# Patient Record
Sex: FEMALE | Race: WHITE | URBAN - METROPOLITAN AREA
[De-identification: names, ages, dates, MRNs, and addresses within clinical notes are randomized per-mention and may not be internally consistent; named-entity substitution may affect disease eponyms.]

---

## 2017-07-01 ENCOUNTER — TRANSFERRED RECORDS (OUTPATIENT)
Dept: HEALTH INFORMATION MANAGEMENT | Facility: CLINIC | Age: 22
End: 2017-07-01

## 2017-07-01 LAB — PAP SMEAR - HIM PATIENT REPORTED: NEGATIVE

## 2018-02-13 ENCOUNTER — OFFICE VISIT (OUTPATIENT)
Dept: DERMATOLOGY | Facility: CLINIC | Age: 23
End: 2018-02-13
Payer: COMMERCIAL

## 2018-02-13 VITALS — OXYGEN SATURATION: 99 % | SYSTOLIC BLOOD PRESSURE: 109 MMHG | HEART RATE: 80 BPM | DIASTOLIC BLOOD PRESSURE: 68 MMHG

## 2018-02-13 DIAGNOSIS — L70.0 ACNE VULGARIS: Primary | ICD-10-CM

## 2018-02-13 PROCEDURE — 99203 OFFICE O/P NEW LOW 30 MIN: CPT | Performed by: PHYSICIAN ASSISTANT

## 2018-02-13 RX ORDER — TRETINOIN 0.25 MG/G
CREAM TOPICAL
Qty: 45 G | Refills: 1 | Status: SHIPPED | OUTPATIENT
Start: 2018-02-13 | End: 2018-11-19

## 2018-02-13 RX ORDER — SPIRONOLACTONE 100 MG/1
TABLET, FILM COATED ORAL
Qty: 90 TABLET | Refills: 1 | Status: SHIPPED | OUTPATIENT
Start: 2018-02-13 | End: 2019-01-16

## 2018-02-13 RX ORDER — DOXYCYCLINE 100 MG/1
CAPSULE ORAL
Qty: 60 CAPSULE | Refills: 1 | Status: SHIPPED | OUTPATIENT
Start: 2018-02-13

## 2018-02-13 NOTE — NURSING NOTE
Chief Complaint   Patient presents with     Acne       Initial /68  Pulse 80  SpO2 99% There is no height or weight on file to calculate BMI.  Medication Reconciliation: complete    Madeline Blackwell MA

## 2018-02-13 NOTE — PATIENT INSTRUCTIONS
Start spironolactone 100 mg once per day     Start doxycycline 100 mg twice per day for 3 months     Apply pea sized amount of tretinoin cream to entire face at night     Follow up in 2 months for recheck on acne

## 2018-02-13 NOTE — MR AVS SNAPSHOT
"              After Visit Summary   2/13/2018    Annabella Garcia    MRN: [id number]           Patient Information     Date Of Birth          1995        Visit Information        Provider Department      2/13/2018 5:30 PM Hallie Us PA-C [facility]        Today's Diagnoses     Acne vulgaris    -  1      Care Instructions    Start spironolactone 100 mg once per day     Start doxycycline 100 mg twice per day for 3 months     Apply pea sized amount of tretinoin cream to entire face at night     Follow up in 2 months for recheck on acne          Follow-ups after your visit        Follow-up notes from your care team     Return in about 2 months (around 4/13/2018).      Who to contact     If you have questions or need follow up information about today's clinic visit or your schedule please contact [facility] directly at [phone].  Normal or non-critical lab and imaging results will be communicated to you by MyChart, letter or phone within 4 business days after the clinic has received the results. If you do not hear from us within 7 days, please contact the clinic through MyChart or phone. If you have a critical or abnormal lab result, we will notify you by phone as soon as possible.  Submit refill requests through [employer] or call your pharmacy and they will forward the refill request to us. Please allow 3 business days for your refill to be completed.          Additional Information About Your Visit        MyChart Information     [employer] lets you send messages to your doctor, view your test results, renew your prescriptions, schedule appointments and more. To sign up, go to [email or web address] . Click on \"Log in\" on the left side of the screen, which will take you to the Welcome page. Then click on \"Sign up Now\" on the right side of the page.     You will be asked to enter the access code listed below, as well as some personal information. Please " follow the directions to create your username and password.     Your access code is: [email or web address]  Expires: 2018  5:46 PM     Your access code will  in 90 days. If you need help or a new code, please call your [facility] clinic or [phone].        Care EveryWhere ID     This is your Care EveryWhere ID. This could be used by other organizations to access your [facility] medical records  [id number]        Your Vitals Were     Pulse Pulse Oximetry                80 99%           Blood Pressure from Last 3 Encounters:   18 109/68   14 112/68    Weight from Last 3 Encounters:   14 60.7 kg (133 lb 12.8 oz) (63 %)*     * Growth percentiles are based on [facility] 2-20 Years data.              Today, you had the following     No orders found for display       Primary Care Provider Fax #    Physician No Ref-Primary [phone]       No address on file        Equal Access to Services     EMPERATRIZ MITCHELL : Hadii jax davis hadasho Soomaali, waaxda luqadaha, qaybta kaalmada adeegyada, sara herring . So [facility] [phone].    ATENCIÓN: Si habla español, tiene a lema disposición servicios gratuitos de asistencia lingüística. Llame al [phone].    We comply with applicable federal civil rights laws and [place] laws. We do not discriminate on the basis of race, color, national origin, age, disability, sex, sexual orientation, or gender identity.            Thank you!     Thank you for choosing [facility]  for your care. Our goal is always to provide you with excellent care. Hearing back from our patients is one way we can continue to improve our services. Please take a few minutes to complete the written survey that you may receive in the mail after your visit with us. Thank you!             Your Updated Medication List - Protect others around you: Learn how to safely use, store and throw away your medicines at www.disposemymeds.org.      Notice  As of  2/13/2018  5:46 PM    You have not been prescribed any medications.

## 2018-02-13 NOTE — LETTER
2/13/2018         RE: Annabella Garcia  [address]  [place] [address]        Dear Colleague,    Thank you for referring your patient, Annabella Garcia, to the [facility]. Please see a copy of my visit note below.    HPI:   Annabella Garcia is a 22 year old female who presents for evaluation of acne.  chief complaint  Condition has been present for: years. Acne was previously well controlled with OCP, however, it flared again when she switched to IUD a couple of months ago  Pt complains of pain: Yes     Previous treatments include: OCP  Menstrual cycles are regular: Yes   Condition flares around period: Yes  Areas Involved: face  IPLEDGE #:   No current outpatient prescriptions on file.     No Known Allergies  Denies any other skin complaints, in general feels well: Yes  Review of symptoms otherwise negative:Yes    Social history: Works in [employer].     This document serves as a record of the services and decisions personally performed and made by Hallie Us, MS, PA-C. It was created on her behalf by Mirela Davidson, a trained medical scribe. The creation of this document is based on the provider's statements to the medical scribe.  Mirela Davidson 5:40 PM February 13, 2018    PHYSICAL EXAM:   A&Ox3: Yes   Well developed/well nourished female Yes   Mood appropriate Yes        Type 2 skin. Mood appropriate  Alert and Oriented X 3. Well developed, well nourished in no distress.  General appearance: Normal  Head including face: Normal  Eyes: conjunctiva and lids: Normal  Mouth: Lips, teeth, gums: Normal  Neck: Normal  Back: clear  Cardiovascular: Exam of peripheral vascular system by observation for swelling, varicosities, edema: Normal  Extremities: digits/nails (clubbing): Normal  Right upper extremity: Normal  Left upper extremity: Normal  Right lower extremity: Normal  Left lower extremity: Normal  Skin: Scalp and body hair: See below     Slime  Papules/Pustules Cysts Staining Scarring   Face/Neck 1-2+ 1-2+ 0 1+ 0   Chest 0 0 0 0 0   Back 0 0 0 0 0     Telangiectasias: No Fixed Erythema: No Exoriations: No   Other Physical Exam Findings:    ASSESSMENT & PLAN:     1. Acne Vulgaris - advised on diagnosis and treatment options. Discussed use of PO and topical medications and antibiotics. Currently using IUD for contraception   --Start spironolactone 100 mg daily. Advised on potential for hypotension, teratogenetic effects, menstrual irregularities, hyperkalemia and need for Q 6 month K+ checks. Advised to stay hydrated.   --Start doxycycline 100 mg BID x 3 months. Advised to take with food. Discussed risk of GI upset, esophagitis and photosensitivity.    --Apply pea sized amount tretinoin 0.025% cream to face at night      Pt advised on use and risks including photosensitivity, allergic reactions, GI upset, headaches, nausea, erythema, scaling, vertigo, asthralgias, blood clots:Yes    Follow-up: 2 months  CC:   Scribed By: Mirela Davidson Medical Scribe    The information in this document, created by the medical scribe for me, accurately reflects the services I personally performed and the decisions made by me. I have reviewed and approved this document for accuracy prior to leaving the patient care area.  February 13, 2018 5:46 PM    Hallie Us MS, PAMERA        Again, thank you for allowing me to participate in the care of your patient.        Sincerely,        Hallie Us PA-C

## 2018-02-13 NOTE — PROGRESS NOTES
HPI:   Annabella Garcai is a 22 year old female who presents for evaluation of acne.  chief complaint  Condition has been present for: years. Acne was previously well controlled with OCP, however, it flared again when she switched to IUD a couple of months ago  Pt complains of pain: Yes     Previous treatments include: OCP  Menstrual cycles are regular: Yes   Condition flares around period: Yes  Areas Involved: face  IPLEDGE #:   No current outpatient prescriptions on file.     No Known Allergies  Denies any other skin complaints, in general feels well: Yes  Review of symptoms otherwise negative:Yes    Social history: Works in [employer] office.     This document serves as a record of the services and decisions personally performed and made by Hallie Us, MS, PA-C. It was created on her behalf by Mirela Davidson, a trained medical scribe. The creation of this document is based on the provider's statements to the medical scribe.  Mirela Davidson 5:40 PM February 13, 2018    PHYSICAL EXAM:   A&Ox3: Yes   Well developed/well nourished female Yes   Mood appropriate Yes        Type 2 skin. Mood appropriate  Alert and Oriented X 3. Well developed, well nourished in no distress.  General appearance: Normal  Head including face: Normal  Eyes: conjunctiva and lids: Normal  Mouth: Lips, teeth, gums: Normal  Neck: Normal  Back: clear  Cardiovascular: Exam of peripheral vascular system by observation for swelling, varicosities, edema: Normal  Extremities: digits/nails (clubbing): Normal  Right upper extremity: Normal  Left upper extremity: Normal  Right lower extremity: Normal  Left lower extremity: Normal  Skin: Scalp and body hair: See below     Comedones Papules/Pustules Cysts Staining Scarring   Face/Neck 1-2+ 1-2+ 0 1+ 0   Chest 0 0 0 0 0   Back 0 0 0 0 0     Telangiectasias: No Fixed Erythema: No Exoriations: No   Other Physical Exam Findings:    ASSESSMENT & PLAN:     1. Acne Vulgaris - advised on diagnosis  and treatment options. Discussed use of PO and topical medications and antibiotics. Currently using IUD for contraception   --Start spironolactone 100 mg daily. Advised on potential for hypotension, teratogenetic effects, menstrual irregularities, hyperkalemia and need for Q 6 month K+ checks. Advised to stay hydrated.   --Start doxycycline 100 mg BID x 3 months. Advised to take with food. Discussed risk of GI upset, esophagitis and photosensitivity.    --Apply pea sized amount tretinoin 0.025% cream to face at night      Pt advised on use and risks including photosensitivity, allergic reactions, GI upset, headaches, nausea, erythema, scaling, vertigo, asthralgias, blood clots:Yes    Follow-up: 2 months  CC:   Scribed By: Mirela Davidson Medical Scribe    The information in this document, created by the medical scribe for me, accurately reflects the services I personally performed and the decisions made by me. I have reviewed and approved this document for accuracy prior to leaving the patient care area.  February 13, 2018 5:46 PM    Hallie Us MS, PA-C

## 2018-04-17 ENCOUNTER — OFFICE VISIT (OUTPATIENT)
Dept: DERMATOLOGY | Facility: CLINIC | Age: 23
End: 2018-04-17
Payer: COMMERCIAL

## 2018-04-17 VITALS — HEART RATE: 56 BPM | SYSTOLIC BLOOD PRESSURE: 103 MMHG | DIASTOLIC BLOOD PRESSURE: 63 MMHG | OXYGEN SATURATION: 99 %

## 2018-04-17 DIAGNOSIS — L70.0 ACNE VULGARIS: Primary | ICD-10-CM

## 2018-04-17 PROCEDURE — 99212 OFFICE O/P EST SF 10 MIN: CPT | Performed by: PHYSICIAN ASSISTANT

## 2018-04-17 NOTE — NURSING NOTE
Chief Complaint   Patient presents with     Acne       Initial /63  Pulse 56  SpO2 99%  Breastfeeding? No There is no height or weight on file to calculate BMI.  Medication Reconciliation: complete

## 2018-04-17 NOTE — LETTER
4/17/2018         RE: Annabella Garcia  [address]  [place] [address]        Dear Colleague,    Thank you for referring your patient, Annabella Garcia, to the [facility]. Please see a copy of my visit note below.    HPI:   Annabella Garcia is a 22 year old female who presents for follow up of acne. On spironolactone and tretinoin. Stopped doxy due to GI upset.   chief complaint  Condition has been present for: years. Acne was previously well controlled with OCP, however, it flared again when she switched to IUD a couple of months ago  Pt complains of pain: Yes     Previous treatments include: OCP  Menstrual cycles are regular: Yes   Condition flares around period: Yes  Areas Involved: face  IPLEDGE #:   Current Outpatient Prescriptions   Medication Sig Dispense Refill     spironolactone (ALDACTONE) 100 MG tablet Take 1 tablet QD 90 tablet 1     doxycycline monohydrate 100 MG capsule Take 1 tablet twice daily with food and large glass of water 60 capsule 1     tretinoin (RETIN-A) 0.025 % cream Apply pea sized amount to face at night 45 g 1     No Known Allergies  Denies any other skin complaints, in general feels well: Yes  Review of symptoms otherwise negative:Yes    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     This document serves as a record of the services and decisions personally performed and made by Hallie Us, MS, PA-C. It was created on her behalf by Mirela Davidson, a trained medical scribe. The creation of this document is based on the provider's statements to the medical scribe.  Mirela Davidson 5:15 PM April 17, 2018    PHYSICAL EXAM:   A&Ox3: Yes   Well developed/well nourished female Yes   Mood appropriate Yes        Type 2 skin. Mood appropriate  Alert and Oriented X 3. Well developed, well nourished in no distress.  General appearance: Normal  Head including face: Normal  Eyes: conjunctiva and lids: Normal  Mouth: Lips, teeth, gums:  Normal  Neck: Normal  Back: clear  Cardiovascular: Exam of peripheral vascular system by observation for swelling, varicosities, edema: Normal  Extremities: digits/nails (clubbing): Normal  Right upper extremity: Normal  Left upper extremity: Normal  Right lower extremity: Normal  Left lower extremity: Normal  Skin: Scalp and body hair: See below     Comedones Papules/Pustules Cysts Staining Scarring   Face/Neck 0-1+ 0 0 2+ 0   Chest 0 0 0 0 0   Back 0 0 0 0 0     Telangiectasias: No Fixed Erythema: No Exoriations: No   Other Physical Exam Findings:    ASSESSMENT & PLAN:     1. Acne Vulgaris - advised on diagnosis and treatment options. Doing great with spironolactone and tretinoin. Using CeraVe foaming cleanser, toner, vitamin C serum, and CeraVe AM/PM moisturizer and tretinoin. Discussed use of PO and topical medications and antibiotics. Currently using IUD for contraception. Had to discontinue doxycycline due to GI upset.   --Continue spironolactone 100 mg daily. Advised on potential for hypotension, teratogenetic effects, menstrual irregularities, hyperkalemia and need for Q 6 month K+ checks. Advised to stay hydrated.   --Continue pea sized amount tretinoin 0.025% cream to face at night      Pt advised on use and risks including photosensitivity, allergic reactions, GI upset, headaches, nausea, erythema, scaling, vertigo, asthralgias, blood clots:Yes    Follow-up: 2 months  CC:   Scribed By: Mirela Davidson Medical Scribe    The information in this document, created by the medical scribe for me, accurately reflects the services I personally performed and the decisions made by me. I have reviewed and approved this document for accuracy prior to leaving the patient care area.  April 17, 2018 5:17 PM    Hallie Us MS, SAROJ        Again, thank you for allowing me to participate in the care of your patient.        Sincerely,        Hallie Us PA-C

## 2018-04-17 NOTE — PROGRESS NOTES
HPI:   Annabella Garcia is a 22 year old female who presents for follow up of acne. On spironolactone and tretinoin. Stopped doxy due to GI upset.   chief complaint  Condition has been present for: years. Acne was previously well controlled with OCP, however, it flared again when she switched to IUD a couple of months ago  Pt complains of pain: Yes     Previous treatments include: OCP  Menstrual cycles are regular: Yes   Condition flares around period: Yes  Areas Involved: face  IPLEDGE #:   Current Outpatient Prescriptions   Medication Sig Dispense Refill     spironolactone (ALDACTONE) 100 MG tablet Take 1 tablet QD 90 tablet 1     doxycycline monohydrate 100 MG capsule Take 1 tablet twice daily with food and large glass of water 60 capsule 1     tretinoin (RETIN-A) 0.025 % cream Apply pea sized amount to face at night 45 g 1     No Known Allergies  Denies any other skin complaints, in general feels well: Yes  Review of symptoms otherwise negative:Yes    Social history: Works in [employer].     This document serves as a record of the services and decisions personally performed and made by Hallie Us, MS, PA-C. It was created on her behalf by Mirela Davidson, a trained medical scribe. The creation of this document is based on the provider's statements to the medical scribe.  Mirela Davidson 5:15 PM April 17, 2018    PHYSICAL EXAM:   A&Ox3: Yes   Well developed/well nourished female Yes   Mood appropriate Yes        Type 2 skin. Mood appropriate  Alert and Oriented X 3. Well developed, well nourished in no distress.  General appearance: Normal  Head including face: Normal  Eyes: conjunctiva and lids: Normal  Mouth: Lips, teeth, gums: Normal  Neck: Normal  Back: clear  Cardiovascular: Exam of peripheral vascular system by observation for swelling, varicosities, edema: Normal  Extremities: digits/nails (clubbing): Normal  Right upper extremity: Normal  Left upper extremity: Normal  Right lower  extremity: Normal  Left lower extremity: Normal  Skin: Scalp and body hair: See below     Comedones Papules/Pustules Cysts Staining Scarring   Face/Neck 0-1+ 0 0 2+ 0   Chest 0 0 0 0 0   Back 0 0 0 0 0     Telangiectasias: No Fixed Erythema: No Exoriations: No   Other Physical Exam Findings:    ASSESSMENT & PLAN:     1. Acne Vulgaris - advised on diagnosis and treatment options. Doing great with spironolactone and tretinoin. Using CeraVe foaming cleanser, toner, vitamin C serum, and CeraVe AM/PM moisturizer and tretinoin. Discussed use of PO and topical medications and antibiotics. Currently using IUD for contraception. Had to discontinue doxycycline due to GI upset.   --Continue spironolactone 100 mg daily. Advised on potential for hypotension, teratogenetic effects, menstrual irregularities, hyperkalemia and need for Q 6 month K+ checks. Advised to stay hydrated.   --Continue pea sized amount tretinoin 0.025% cream to face at night      Pt advised on use and risks including photosensitivity, allergic reactions, GI upset, headaches, nausea, erythema, scaling, vertigo, asthralgias, blood clots:Yes    Follow-up: 2 months  CC:   Scribed By: Mirela Davidson Medical Scribe    The information in this document, created by the medical scribe for me, accurately reflects the services I personally performed and the decisions made by me. I have reviewed and approved this document for accuracy prior to leaving the patient care area.  April 17, 2018 5:17 PM    Hallie Us MS, PA-C

## 2018-06-19 ENCOUNTER — OFFICE VISIT (OUTPATIENT)
Dept: DERMATOLOGY | Facility: CLINIC | Age: 23
End: 2018-06-19
Payer: COMMERCIAL

## 2018-06-19 VITALS — SYSTOLIC BLOOD PRESSURE: 106 MMHG | OXYGEN SATURATION: 97 % | DIASTOLIC BLOOD PRESSURE: 71 MMHG | HEART RATE: 58 BPM

## 2018-06-19 DIAGNOSIS — L70.0 ACNE VULGARIS: ICD-10-CM

## 2018-06-19 DIAGNOSIS — D48.5 NEOPLASM OF UNCERTAIN BEHAVIOR OF SKIN: Primary | ICD-10-CM

## 2018-06-19 PROCEDURE — 40490 BIOPSY OF LIP: CPT | Performed by: PHYSICIAN ASSISTANT

## 2018-06-19 PROCEDURE — 99213 OFFICE O/P EST LOW 20 MIN: CPT | Mod: 25 | Performed by: PHYSICIAN ASSISTANT

## 2018-06-19 PROCEDURE — 88312 SPECIAL STAINS GROUP 1: CPT | Mod: TC | Performed by: PHYSICIAN ASSISTANT

## 2018-06-19 PROCEDURE — 88305 TISSUE EXAM BY PATHOLOGIST: CPT | Mod: TC | Performed by: PHYSICIAN ASSISTANT

## 2018-06-19 RX ORDER — AMPICILLIN TRIHYDRATE 500 MG
CAPSULE ORAL
Qty: 60 CAPSULE | Refills: 2 | Status: SHIPPED | OUTPATIENT
Start: 2018-06-19

## 2018-06-19 RX ORDER — SPIRONOLACTONE 50 MG/1
TABLET, FILM COATED ORAL
Qty: 90 TABLET | Refills: 1 | Status: SHIPPED | OUTPATIENT
Start: 2018-06-19 | End: 2018-11-27

## 2018-06-19 RX ORDER — SPIRONOLACTONE 100 MG/1
100 TABLET, FILM COATED ORAL DAILY
Qty: 90 TABLET | Refills: 1 | Status: SHIPPED | OUTPATIENT
Start: 2018-06-19 | End: 2018-08-31

## 2018-06-19 NOTE — MR AVS SNAPSHOT
After Visit Summary   6/19/2018    Annabella Garcia    MRN: [id number]           Patient Information     Date Of Birth          1995        Visit Information        Provider Department      6/19/2018 4:30 PM Hallie Us PA-C [facility]        Today's Diagnoses     Neoplasm of uncertain behavior of skin    -  1    Acne vulgaris          Care Instructions      Wound Care Instructions     FOR SUPERFICIAL WOUNDS     [facility] [phone]                 AFTER 24 HOURS YOU SHOULD REMOVE THE BANDAGE AND BEGIN DAILY DRESSING CHANGES AS FOLLOWS:     1) Remove Dressing.     2) Clean and dry the area with tap water using a Q-tip or sterile gauze pad.     3) Apply Vaseline, Aquaphor, Polysporin ointment or Bacitracin ointment over entire wound.  Do NOT use Neosporin ointment.     4) Cover the wound with a band-aid, or a sterile non-stick gauze pad and micropore paper tape      REPEAT THESE INSTRUCTIONS AT LEAST ONCE A DAY UNTIL THE WOUND HAS COMPLETELY HEALED.    It is an old wives tale that a wound heals better when it is exposed to air and allowed to dry out. The wound will heal faster with a better cosmetic result if it is kept moist with ointment and covered with a bandage.    **Do not let the wound dry out.**      Supplies Needed:      *Cotton tipped applicators (Q-tips)    *Polysporin Ointment or Bacitracin Ointment (NOT NEOSPORIN)    *Band-aids or non-stick gauze pads and micropore paper tape.      PATIENT INFORMATION:    During the healing process you will notice a number of changes. All wounds develop a small halo of redness surrounding the wound.  This means healing is occurring. Severe itching with extensive redness usually indicates sensitivity to the ointment or bandage tape used to dress the wound.  You should call our office if this develops.      Swelling  and/or discoloration around your surgical site is common, particularly when performed  around the eye.    All wounds normally drain.  The larger the wound the more drainage there will be.  After 7-10 days, you will notice the wound beginning to shrink and new skin will begin to grow.  The wound is healed when you can see skin has formed over the entire area.  A healed wound has a healthy, shiny look to the surface and is red to dark pink in color to normalize.  Wounds may take approximately 4-6 weeks to heal.  Larger wounds may take 6-8 weeks.  After the wound is healed you may discontinue dressing changes.    You may experience a sensation of tightness as your wound heals. This is normal and will gradually subside.    Your healed wound may be sensitive to temperature changes. This sensitivity improves with time, but if you re having a lot of discomfort, try to avoid temperature extremes.    Patients frequently experience itching after their wound appears to have healed because of the continue healing under the skin.  Plain Vaseline will help relieve the itching.        POSSIBLE COMPLICATIONS    BLEEDIN. Leave the bandage in place.  2. Use tightly rolled up gauze or a cloth to apply direct pressure over the bandage for 30  minutes.  3. Reapply pressure for an additional 30 minutes if necessary  4. Use additional gauze and tape to maintain pressure once the bleeding has stopped.            Follow-ups after your visit        Who to contact     If you have questions or need follow up information about today's clinic visit or your schedule please contact [facility] directly at [phone].  Normal or non-critical lab and imaging results will be communicated to you by MyChart, letter or phone within 4 business days after the clinic has received the results. If you do not hear from us within 7 days, please contact the clinic through MyChart or phone. If you have a critical or abnormal lab result, we will notify you by phone as soon as possible.  Submit refill requests  "through avVenta or call your pharmacy and they will forward the refill request to us. Please allow 3 business days for your refill to be completed.          Additional Information About Your Visit        [employer] Information     avVenta lets you send messages to your doctor, view your test results, renew your prescriptions, schedule appointments and more. To sign up, go to [email or web address] . Click on \"Log in\" on the left side of the screen, which will take you to the Welcome page. Then click on \"Sign up Now\" on the right side of the page.     You will be asked to enter the access code listed below, as well as some personal information. Please follow the directions to create your username and password.     Your access code is: [email or web address]  Expires: 2018  4:57 PM     Your access code will  in 90 days. If you need help or a new code, please call your [facility] or [phone].        Care EveryWhere ID     This is your Care EveryWhere ID. This could be used by other organizations to access your [facility] medical records  [id number]        Your Vitals Were     Pulse Pulse Oximetry Breastfeeding?             58 97% No          Blood Pressure from Last 3 Encounters:   18 106/71   18 103/63   18 109/68    Weight from Last 3 Encounters:   14 60.7 kg (133 lb 12.8 oz) (63 %)*     * Growth percentiles are based on [facility] 2-20 Years data.              We Performed the Following     BIOPSY SKIN/SUBQ/MUC MEM, SINGLE LESION     Dermatological path order and indications          Today's Medication Changes          These changes are accurate as of 18  4:57 PM.  If you have any questions, ask your nurse or doctor.               Start taking these medicines.        Dose/Directions    ampicillin 500 MG capsule   Commonly known as:  PRINCIPEN   Used for:  Acne vulgaris   Started by:  Hallie Us PA-C        1 cap PO BID   Quantity:  60 capsule   Refills:  2         These " medicines have changed or have updated prescriptions.        Dose/Directions    * spironolactone 100 MG tablet   Commonly known as:  ALDACTONE   This may have changed:  Another medication with the same name was added. Make sure you understand how and when to take each.   Used for:  Acne vulgaris   Changed by:  Hallie Us PA-C        Take 1 tablet QD   Quantity:  90 tablet   Refills:  1       * spironolactone 50 MG tablet   Commonly known as:  ALDACTONE   This may have changed:  You were already taking a medication with the same name, and this prescription was added. Make sure you understand how and when to take each.   Used for:  Acne vulgaris   Changed by:  Hallie Us PA-C        1 tab PO QD   Quantity:  90 tablet   Refills:  1       * spironolactone 100 MG tablet   Commonly known as:  ALDACTONE   This may have changed:  You were already taking a medication with the same name, and this prescription was added. Make sure you understand how and when to take each.   Used for:  Acne vulgaris   Changed by:  Hallie Us PA-C        Dose:  100 mg   Take 1 tablet (100 mg) by mouth daily   Quantity:  90 tablet   Refills:  1       * Notice:  This list has 3 medication(s) that are the same as other medications prescribed for you. Read the directions carefully, and ask your doctor or other care provider to review them with you.         Where to get your medicines      These medications were sent to [facility] [id number] [place] [address] AT [facility] [address]  [address] [place] [address], [facility] [address]     Phone:  [phone]     ampicillin 500 MG capsule    spironolactone 100 MG tablet    spironolactone 50 MG tablet                Primary Care Provider Fax #    Physician No Ref-Primary [phone]       No address on file        Equal Access to Services     EMPERATRIZ MITCHELL AH: Edil Wolfe, juliocesar mendez, sara guevara  sanjeev zamarripaaaaristeo ah. So [facility] [phone].    ATENCIÓN: Si jacky jenkins, tiene a lema disposición servicios gratuitos de asistencia lingüística. Shady diggs [phone].    We comply with applicable federal civil rights laws and [place] laws. We do not discriminate on the basis of race, color, national origin, age, disability, sex, sexual orientation, or gender identity.            Thank you!     Thank you for choosing [facility]  for your care. Our goal is always to provide you with excellent care. Hearing back from our patients is one way we can continue to improve our services. Please take a few minutes to complete the written survey that you may receive in the mail after your visit with us. Thank you!             Your Updated Medication List - Protect others around you: Learn how to safely use, store and throw away your medicines at www.disposemymeds.org.          This list is accurate as of 6/19/18  4:57 PM.  Always use your most recent med list.                   Brand Name Dispense Instructions for use Diagnosis    ampicillin 500 MG capsule    PRINCIPEN    60 capsule    1 cap PO BID    Acne vulgaris       doxycycline monohydrate 100 MG capsule     60 capsule    Take 1 tablet twice daily with food and large glass of water    Acne vulgaris       * spironolactone 100 MG tablet    ALDACTONE    90 tablet    Take 1 tablet QD    Acne vulgaris       * spironolactone 50 MG tablet    ALDACTONE    90 tablet    1 tab PO QD    Acne vulgaris       * spironolactone 100 MG tablet    ALDACTONE    90 tablet    Take 1 tablet (100 mg) by mouth daily    Acne vulgaris       tretinoin 0.025 % cream    RETIN-A    45 g    Apply pea sized amount to face at night    Acne vulgaris       * Notice:  This list has 3 medication(s) that are the same as other medications prescribed for you. Read the directions carefully, and ask your doctor or other care provider to review them with you.

## 2018-06-19 NOTE — LETTER
6/19/2018         RE: Annabella Garcia  [address]  [facility] [place] [facility] [address]        Dear Colleague,    Thank you for referring your patient, Annabella Garcia, to the [facility]. Please see a copy of my visit note below.    HPI:   Annabella Garcia is a 22 year old female who presents for follow up of acne. On spironolactone and tretinoin. Stopped doxy due to GI upset. Improved but feels like she has plateaued.   chief complaint  Condition has been present for: years. Acne was previously well controlled with OCP, however, it flared again when she switched to IUD a couple of months ago  Pt complains of pain: Yes     Previous treatments include: OCP  Menstrual cycles are regular: Yes   Condition flares around period: Yes  Areas Involved: face  IPLEDGE #:   Current Outpatient Prescriptions   Medication Sig Dispense Refill     spironolactone (ALDACTONE) 100 MG tablet Take 1 tablet QD 90 tablet 1     tretinoin (RETIN-A) 0.025 % cream Apply pea sized amount to face at night 45 g 1     doxycycline monohydrate 100 MG capsule Take 1 tablet twice daily with food and large glass of water (Patient not taking: Reported on 6/19/2018) 60 capsule 1     No Known Allergies  Denies any other skin complaints, in general feels well: Yes  Review of symptoms otherwise negative:Yes    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     This document serves as a record of the services and decisions personally performed and made by Hallie Us, MS, PA-C. It was created on her behalf by Vi Mir, a trained medical scribe. The creation of this document is based on the provider's statements to the medical scribe.  Vi Mir 4:39 PM June 19, 2018    PHYSICAL EXAM:   A&Ox3: Yes   Well developed/well nourished female Yes   Mood appropriate Yes      /71  Pulse 58  SpO2 97%  Breastfeeding? No  Type 2 skin. Mood appropriate  Alert and Oriented X 3. Well developed,  well nourished in no distress.  General appearance: Normal  Head including face: Normal  Eyes: conjunctiva and lids: Normal  Mouth: Lips, teeth, gums: Normal  Neck: Normal  Back: clear  Cardiovascular: Exam of peripheral vascular system by observation for swelling, varicosities, edema: Normal  Extremities: digits/nails (clubbing): Normal  Right upper extremity: Normal  Left upper extremity: Normal  Right lower extremity: Normal  Left lower extremity: Normal  Skin: Scalp and body hair: See below     Comedones Papules/Pustules Cysts Staining Scarring   Face/Neck 0-1+ 1-2+ 0 2+ 0   Chest 0 0 0 0 0   Back 0 0 0 0 0     Telangiectasias: No Fixed Erythema: No Exoriations: No   Other Physical Exam Findings:  1. 4 mm dark brown/light brown macule on left eyelid margin   2. 5 mm pink macule on left upper cutaneous lip.  ASSESSMENT & PLAN:     1. Acne Vulgaris - advised on diagnosis and treatment options. Doing great with spironolactone and tretinoin. Using CeraVe foaming cleanser, toner, vitamin C serum, and CeraVe AM/PM moisturizer and tretinoin. Discussed use of PO and topical medications and antibiotics. Currently using IUD for contraception. Had to discontinue doxycycline due to GI upset. With her current   --Increase to spironolactone 150 mg daily. Advised on potential for hypotension, teratogenetic effects, menstrual irregularities, hyperkalemia and need for Q 6 month K+ checks. Advised to stay hydrated.   --Continue pea sized amount tretinoin 0.025% cream to face at night  --Start ampicillin 500 mg BID x 3 months  2. Neus on left eyelid margin, will monitor.  Photo taken and placed in chart. Per pt she has had this since she was a baby and has not changed.   3. R/o BCC on left upper cutaneous lip. Per pt has had this non healing lesion for the past 6 years. She does not pick the area. Photo taken and placed in chart. Shave bx in typical fashion .  Area cleaned with betadyne and anesthetized with 1% lidocaine with epi  .  Dermablade used to remove the lesion and sent to pathology. Bleeding was cauterized. Pt tolerated procedure well.          Pt advised on use and risks including photosensitivity, allergic reactions, GI upset, headaches, nausea, erythema, scaling, vertigo, asthralgias, blood clots:Yes    Follow-up: 2 months  CC:   Scribed By: Vi Mir, Medical Scribe    The information in this document, created by the medical scribe for me, accurately reflects the services I personally performed and the decisions made by me. I have reviewed and approved this document for accuracy prior to leaving the patient care area.  June 19, 2018 4:50 PM    Hallie Us MS, PAMERA        Again, thank you for allowing me to participate in the care of your patient.        Sincerely,        Hallie Us PA-C

## 2018-06-19 NOTE — PATIENT INSTRUCTIONS
Wound Care Instructions     FOR SUPERFICIAL WOUNDS     [facility] [phone]                 AFTER 24 HOURS YOU SHOULD REMOVE THE BANDAGE AND BEGIN DAILY DRESSING CHANGES AS FOLLOWS:     1) Remove Dressing.     2) Clean and dry the area with tap water using a Q-tip or sterile gauze pad.     3) Apply Vaseline, Aquaphor, Polysporin ointment or Bacitracin ointment over entire wound.  Do NOT use Neosporin ointment.     4) Cover the wound with a band-aid, or a sterile non-stick gauze pad and micropore paper tape      REPEAT THESE INSTRUCTIONS AT LEAST ONCE A DAY UNTIL THE WOUND HAS COMPLETELY HEALED.    It is an old wives tale that a wound heals better when it is exposed to air and allowed to dry out. The wound will heal faster with a better cosmetic result if it is kept moist with ointment and covered with a bandage.    **Do not let the wound dry out.**      Supplies Needed:      *Cotton tipped applicators (Q-tips)    *Polysporin Ointment or Bacitracin Ointment (NOT NEOSPORIN)    *Band-aids or non-stick gauze pads and micropore paper tape.      PATIENT INFORMATION:    During the healing process you will notice a number of changes. All wounds develop a small halo of redness surrounding the wound.  This means healing is occurring. Severe itching with extensive redness usually indicates sensitivity to the ointment or bandage tape used to dress the wound.  You should call our office if this develops.      Swelling  and/or discoloration around your surgical site is common, particularly when performed around the eye.    All wounds normally drain.  The larger the wound the more drainage there will be.  After 7-10 days, you will notice the wound beginning to shrink and new skin will begin to grow.  The wound is healed when you can see skin has formed over the entire area.  A healed wound has a healthy, shiny look to the surface and is red to dark pink in color to normalize.  Wounds may take approximately 4-6  weeks to heal.  Larger wounds may take 6-8 weeks.  After the wound is healed you may discontinue dressing changes.    You may experience a sensation of tightness as your wound heals. This is normal and will gradually subside.    Your healed wound may be sensitive to temperature changes. This sensitivity improves with time, but if you re having a lot of discomfort, try to avoid temperature extremes.    Patients frequently experience itching after their wound appears to have healed because of the continue healing under the skin.  Plain Vaseline will help relieve the itching.        POSSIBLE COMPLICATIONS    BLEEDIN. Leave the bandage in place.  2. Use tightly rolled up gauze or a cloth to apply direct pressure over the bandage for 30  minutes.  3. Reapply pressure for an additional 30 minutes if necessary  4. Use additional gauze and tape to maintain pressure once the bleeding has stopped.

## 2018-06-19 NOTE — PROGRESS NOTES
HPI:   Annabella Garcia is a 22 year old female who presents for follow up of acne. On spironolactone and tretinoin. Stopped doxy due to GI upset. Improved but feels like she has plateaued.   chief complaint  Condition has been present for: years. Acne was previously well controlled with OCP, however, it flared again when she switched to IUD a couple of months ago  Pt complains of pain: Yes     Previous treatments include: OCP  Menstrual cycles are regular: Yes   Condition flares around period: Yes  Areas Involved: face  IPLEDGE #:   Current Outpatient Prescriptions   Medication Sig Dispense Refill     spironolactone (ALDACTONE) 100 MG tablet Take 1 tablet QD 90 tablet 1     tretinoin (RETIN-A) 0.025 % cream Apply pea sized amount to face at night 45 g 1     doxycycline monohydrate 100 MG capsule Take 1 tablet twice daily with food and large glass of water (Patient not taking: Reported on 6/19/2018) 60 capsule 1     No Known Allergies  Denies any other skin complaints, in general feels well: Yes  Review of symptoms otherwise negative:Yes    Social history: Works in [employer] office.     This document serves as a record of the services and decisions personally performed and made by Hallie Us, MS, PA-C. It was created on her behalf by Vi Mir, a trained medical scribe. The creation of this document is based on the provider's statements to the medical scribe.  Vi Mir 4:39 PM June 19, 2018    PHYSICAL EXAM:   A&Ox3: Yes   Well developed/well nourished female Yes   Mood appropriate Yes      /71  Pulse 58  SpO2 97%  Breastfeeding? No  Type 2 skin. Mood appropriate  Alert and Oriented X 3. Well developed, well nourished in no distress.  General appearance: Normal  Head including face: Normal  Eyes: conjunctiva and lids: Normal  Mouth: Lips, teeth, gums: Normal  Neck: Normal  Back: clear  Cardiovascular: Exam of peripheral vascular system by observation for swelling,  varicosities, edema: Normal  Extremities: digits/nails (clubbing): Normal  Right upper extremity: Normal  Left upper extremity: Normal  Right lower extremity: Normal  Left lower extremity: Normal  Skin: Scalp and body hair: See below     Comedones Papules/Pustules Cysts Staining Scarring   Face/Neck 0-1+ 1-2+ 0 2+ 0   Chest 0 0 0 0 0   Back 0 0 0 0 0     Telangiectasias: No Fixed Erythema: No Exoriations: No   Other Physical Exam Findings:  1. 4 mm dark brown/light brown macule on left eyelid margin   2. 5 mm pink macule on left upper cutaneous lip.  ASSESSMENT & PLAN:     1. Acne Vulgaris - advised on diagnosis and treatment options. Doing great with spironolactone and tretinoin. Using CeraVe foaming cleanser, toner, vitamin C serum, and CeraVe AM/PM moisturizer and tretinoin. Discussed use of PO and topical medications and antibiotics. Currently using IUD for contraception. Had to discontinue doxycycline due to GI upset. With her current   --Increase to spironolactone 150 mg daily. Advised on potential for hypotension, teratogenetic effects, menstrual irregularities, hyperkalemia and need for Q 6 month K+ checks. Advised to stay hydrated.   --Continue pea sized amount tretinoin 0.025% cream to face at night  --Start ampicillin 500 mg BID x 3 months  2. Neus on left eyelid margin, will monitor.  Photo taken and placed in chart. Per pt she has had this since she was a baby and has not changed.   3. R/o BCC on left upper cutaneous lip. Per pt has had this non healing lesion for the past 6 years. She does not pick the area. Photo taken and placed in chart. Shave bx in typical fashion .  Area cleaned with betadyne and anesthetized with 1% lidocaine with epi .  Dermablade used to remove the lesion and sent to pathology. Bleeding was cauterized. Pt tolerated procedure well.          Pt advised on use and risks including photosensitivity, allergic reactions, GI upset, headaches, nausea, erythema, scaling, vertigo,  asthralgias, blood clots:Yes    Follow-up: 2 months  CC:   Scribed By: Vi Mir, Medical Scribe    The information in this document, created by the medical scribe for me, accurately reflects the services I personally performed and the decisions made by me. I have reviewed and approved this document for accuracy prior to leaving the patient care area.  June 19, 2018 4:50 PM    Hallie Us MS, PA-C

## 2018-06-27 LAB — COPATH REPORT: NORMAL

## 2018-06-28 ENCOUNTER — TELEPHONE (OUTPATIENT)
Dept: DERMATOLOGY | Facility: CLINIC | Age: 23
End: 2018-06-28

## 2018-06-28 NOTE — TELEPHONE ENCOUNTER
Left message on answering machine for patient to call back.  Charlene WHEELER RN BSN  [facility]  [phone]  [facility]   [phone]

## 2018-06-28 NOTE — TELEPHONE ENCOUNTER
Patient notified of test results and providers message, patient has no further questions.    Charlene WHEELERRN BSN  [facility]  [phone]

## 2018-06-28 NOTE — TELEPHONE ENCOUNTER
Notes Recorded by Hallie Us PA-C on 6/28/2018 at 9:01 AM  Good news no skin cancer seen on upper lip. Looks like either a folliculitis or a chronically picked area. Hopefully will heal normally now after the biopsy. Please have her keep area moist and covered until completely resolved.

## 2018-07-05 ENCOUNTER — OFFICE VISIT (OUTPATIENT)
Dept: FAMILY MEDICINE | Facility: CLINIC | Age: 23
End: 2018-07-05
Payer: COMMERCIAL

## 2018-07-05 VITALS
TEMPERATURE: 98.1 F | SYSTOLIC BLOOD PRESSURE: 109 MMHG | HEIGHT: 65 IN | BODY MASS INDEX: 21.83 KG/M2 | HEART RATE: 69 BPM | OXYGEN SATURATION: 100 % | DIASTOLIC BLOOD PRESSURE: 76 MMHG | WEIGHT: 131 LBS | RESPIRATION RATE: 16 BRPM

## 2018-07-05 DIAGNOSIS — F41.1 GAD (GENERALIZED ANXIETY DISORDER): Primary | ICD-10-CM

## 2018-07-05 PROCEDURE — 99203 OFFICE O/P NEW LOW 30 MIN: CPT | Performed by: PHYSICIAN ASSISTANT

## 2018-07-05 ASSESSMENT — ANXIETY QUESTIONNAIRES
3. WORRYING TOO MUCH ABOUT DIFFERENT THINGS: MORE THAN HALF THE DAYS
5. BEING SO RESTLESS THAT IT IS HARD TO SIT STILL: NEARLY EVERY DAY
2. NOT BEING ABLE TO STOP OR CONTROL WORRYING: NEARLY EVERY DAY
7. FEELING AFRAID AS IF SOMETHING AWFUL MIGHT HAPPEN: MORE THAN HALF THE DAYS
1. FEELING NERVOUS, ANXIOUS, OR ON EDGE: MORE THAN HALF THE DAYS
GAD7 TOTAL SCORE: 16
IF YOU CHECKED OFF ANY PROBLEMS ON THIS QUESTIONNAIRE, HOW DIFFICULT HAVE THESE PROBLEMS MADE IT FOR YOU TO DO YOUR WORK, TAKE CARE OF THINGS AT HOME, OR GET ALONG WITH OTHER PEOPLE: VERY DIFFICULT
6. BECOMING EASILY ANNOYED OR IRRITABLE: MORE THAN HALF THE DAYS

## 2018-07-05 ASSESSMENT — PATIENT HEALTH QUESTIONNAIRE - PHQ9: 5. POOR APPETITE OR OVEREATING: MORE THAN HALF THE DAYS

## 2018-07-05 NOTE — NURSING NOTE
"Chief Complaint   Patient presents with     Anxiety     initial /76 (BP Location: Left arm, Cuff Size: Adult Regular)  Pulse 69  Temp 98.1  F (36.7  C) (Oral)  Resp 16  Ht 5' 5\" (1.651 m)  Wt 131 lb (59.4 kg)  SpO2 100%  BMI 21.8 kg/m2 Estimated body mass index is 21.8 kg/(m^2) as calculated from the following:    Height as of this encounter: 5' 5\" (1.651 m).    Weight as of this encounter: 131 lb (59.4 kg).  BP completed using cuff size: regular. Left  arm      Health Maintenance that is potentially due pending provider review:  NONE    n/a    Richard Suarez ma  "

## 2018-07-05 NOTE — Clinical Note
Please abstract the following data from this visit with this patient into the appropriate field in Epic:  Pap smear done on this date: July 2017 (approximately), by this group: [facility], results were normal.

## 2018-07-05 NOTE — PROGRESS NOTES
"  SUBJECTIVE:   Annabella Garcia is a 23 year old female who presents to clinic today for the following health issues:              Problem list and histories reviewed & adjusted, as indicated.  Additional history: 22 y/o NP female here to discuss some anxiety.  During her yamileth year in college, she has some trouble sleeping and anxiety.  She did get evaluated and was placed on zoloft, and instantly felt better.  Sleeping better, more motivation, and eventually went off.  Has been doing well until this winter.  She has started to get more depression and fatigue.  Tends to sleep more.  She is in a job she really likes, and everything in her life is going well.    BP Readings from Last 3 Encounters:   07/05/18 109/76   06/19/18 106/71   04/17/18 103/63    Wt Readings from Last 3 Encounters:   07/05/18 131 lb (59.4 kg)   04/18/14 133 lb 12.8 oz (60.7 kg) (63 %)*     * Growth percentiles are based on CDC 2-20 Years data.                    Reviewed and updated as needed this visit by clinical staff  Tobacco  Allergies  Meds       Reviewed and updated as needed this visit by Provider         ROS:  Constitutional, HEENT, cardiovascular, pulmonary, gi and gu systems are negative, except as otherwise noted.    OBJECTIVE:     /76 (BP Location: Left arm, Cuff Size: Adult Regular)  Pulse 69  Temp 98.1  F (36.7  C) (Oral)  Resp 16  Ht 5' 5\" (1.651 m)  Wt 131 lb (59.4 kg)  SpO2 100%  BMI 21.8 kg/m2  Body mass index is 21.8 kg/(m^2).  GENERAL: alert and no distress  EYES: Eyes grossly normal to inspection  RESP: lungs clear to auscultation - no rales, rhonchi or wheezes  CV: regular rate and rhythm, normal S1 S2, no S3 or S4, no murmur, click or rub, no peripheral edema and peripheral pulses strong  PSYCH: mentation appears normal, affect normal/bright    Diagnostic Test Results:  none     ASSESSMENT/PLAN:             1. JAYRO (generalized anxiety disorder)  Because she has done so well with zoloft in the past, will " restart.  Encourage contact in a couple of weeks to see how things are going.  - sertraline (ZOLOFT) 50 MG tablet; Take 1/2 tablet (25 mg) for 1-2 weeks, then increase to 1 tablet orally daily  Dispense: 30 tablet; Refill: 3        Fritz Lakhani PA-C  [facility]

## 2018-07-05 NOTE — MR AVS SNAPSHOT
"              After Visit Summary   7/5/2018    Annabella Garcia    MRN: [id number]           Patient Information     Date Of Birth          1995        Visit Information        Provider Department      7/5/2018 9:20 AM Fritz Lakhani PA-C [facility]        Today's Diagnoses     JAYRO (generalized anxiety disorder)    -  1       Follow-ups after your visit        Your next 10 appointments already scheduled     Aug 21, 2018  4:00 PM CDT   [facility]    [address] [address] [phone]              Who to contact     If you have questions or need follow up information about today's clinic visit or your schedule please contact [facility] directly at [phone].  Normal or non-critical lab and imaging results will be communicated to you by MyChart, letter or phone within 4 business days after the clinic has received the results. If you do not hear from us within 7 days, please contact the clinic through MyChart or phone. If you have a critical or abnormal lab result, we will notify you by phone as soon as possible.  Submit refill requests through [employer] or call your pharmacy and they will forward the refill request to us. Please allow 3 business days for your refill to be completed.          Additional Information About Your Visit        MyChart Information     [employer] lets you send messages to your doctor, view your test results, renew your prescriptions, schedule appointments and more. To sign up, go to [email or web address] . Click on \"Log in\" on the left side of the screen, which will take you to the Welcome page. Then click on \"Sign up Now\" on the right side of the page.     You will be asked to enter the access code listed below, as well as some personal information. Please follow the directions to create your username and password.   " "  Your access code is: [email or web address]  Expires: 2018  4:57 PM     Your access code will  in 90 days. If you need help or a new code, please call your [facility] or [phone].        Care EveryWhere ID     This is your Care EveryWhere ID. This could be used by other organizations to access your [place] medical records  [id number]        Your Vitals Were     Pulse Temperature Respirations Height Pulse Oximetry BMI (Body Mass Index)    69 98.1  F (36.7  C) (Oral) 16 5' 5\" (1.651 m) 100% 21.8 kg/m2       Blood Pressure from Last 3 Encounters:   18 109/76   18 106/71   18 103/63    Weight from Last 3 Encounters:   18 131 lb (59.4 kg)   14 133 lb 12.8 oz (60.7 kg) (63 %)*     * Growth percentiles are based on [facility] 2-20 Years data.              Today, you had the following     No orders found for display         Today's Medication Changes          These changes are accurate as of 18  9:42 AM.  If you have any questions, ask your nurse or doctor.               Start taking these medicines.        Dose/Directions    sertraline 50 MG tablet   Commonly known as:  ZOLOFT   Used for:  JAYRO (generalized anxiety disorder)   Started by:  Fritz Lakhani PA-C        Take 1/2 tablet (25 mg) for 1-2 weeks, then increase to 1 tablet orally daily   Quantity:  30 tablet   Refills:  3            Where to get your medicines      These medications were sent to [facility] [address] [place] [address] [address] AT [address]  [address] [facility] [address]     Phone:  [phone]     sertraline 50 MG tablet                Primary Care Provider Fax #    Physician No Ref-Primary [phone]       No address on file        Equal Access to Services     MILLICENT MITCHELL AH: Edil Wolfe, waaxda luqadaha, qaybta kaalyaya palumbo, sara hollis. [facility] [phone].    ATENCIÓN: Si carter salas " disposición servicios gratuitos de asistencia lingüística. Sahdy diggs [phone].    We comply with applicable federal civil rights laws and [place] laws. We do not discriminate on the basis of race, color, national origin, age, disability, sex, sexual orientation, or gender identity.            Thank you!     Thank you for choosing [facility]  for your care. Our goal is always to provide you with excellent care. Hearing back from our patients is one way we can continue to improve our services. Please take a few minutes to complete the written survey that you may receive in the mail after your visit with us. Thank you!             Your Updated Medication List - Protect others around you: Learn how to safely use, store and throw away your medicines at www.disposemymeds.org.          This list is accurate as of 7/5/18  9:42 AM.  Always use your most recent med list.                   Brand Name Dispense Instructions for use Diagnosis    ampicillin 500 MG capsule    PRINCIPEN    60 capsule    1 cap PO BID    Acne vulgaris       doxycycline monohydrate 100 MG capsule     60 capsule    Take 1 tablet twice daily with food and large glass of water    Acne vulgaris       sertraline 50 MG tablet    ZOLOFT    30 tablet    Take 1/2 tablet (25 mg) for 1-2 weeks, then increase to 1 tablet orally daily    JAYRO (generalized anxiety disorder)       * spironolactone 100 MG tablet    ALDACTONE    90 tablet    Take 1 tablet QD    Acne vulgaris       * spironolactone 50 MG tablet    ALDACTONE    90 tablet    1 tab PO QD    Acne vulgaris       * spironolactone 100 MG tablet    ALDACTONE    90 tablet    Take 1 tablet (100 mg) by mouth daily    Acne vulgaris       tretinoin 0.025 % cream    RETIN-A    45 g    Apply pea sized amount to face at night    Acne vulgaris       * Notice:  This list has 3 medication(s) that are the same as other medications prescribed for you. Read the directions carefully, and ask your doctor or  other care provider to review them with you.

## 2018-07-06 ASSESSMENT — ANXIETY QUESTIONNAIRES: GAD7 TOTAL SCORE: 16

## 2018-08-21 ENCOUNTER — OFFICE VISIT (OUTPATIENT)
Dept: DERMATOLOGY | Facility: CLINIC | Age: 23
End: 2018-08-21
Payer: COMMERCIAL

## 2018-08-21 VITALS — SYSTOLIC BLOOD PRESSURE: 110 MMHG | HEART RATE: 54 BPM | DIASTOLIC BLOOD PRESSURE: 67 MMHG | OXYGEN SATURATION: 97 %

## 2018-08-21 DIAGNOSIS — Z51.81 THERAPEUTIC DRUG MONITORING: Primary | ICD-10-CM

## 2018-08-21 DIAGNOSIS — D22.9 NEVUS: ICD-10-CM

## 2018-08-21 DIAGNOSIS — L70.0 ACNE VULGARIS: ICD-10-CM

## 2018-08-21 LAB — POTASSIUM SERPL-SCNC: 4.4 MMOL/L (ref 3.4–5.3)

## 2018-08-21 PROCEDURE — 36415 COLL VENOUS BLD VENIPUNCTURE: CPT | Performed by: PHYSICIAN ASSISTANT

## 2018-08-21 PROCEDURE — 99213 OFFICE O/P EST LOW 20 MIN: CPT | Performed by: PHYSICIAN ASSISTANT

## 2018-08-21 PROCEDURE — 84132 ASSAY OF SERUM POTASSIUM: CPT | Performed by: PHYSICIAN ASSISTANT

## 2018-08-21 NOTE — PROGRESS NOTES
HPI:   Annabella Garcia is a 23 year old female who presents for follow up of acne. On spironolactone 150 mg, tretinoin, and ampicillin. Doing well.   chief complaint  Condition has been present for: years. Acne was previously well controlled with OCP, however, it flared again when she switched to IUD a couple of months ago  Pt complains of pain: Yes     Previous treatments include: OCP  Menstrual cycles are regular: Yes   Condition flares around period: Yes  Areas Involved: face  IPLEDGE #:   Current Outpatient Prescriptions   Medication Sig Dispense Refill     ampicillin (PRINCIPEN) 500 MG capsule 1 cap PO BID 60 capsule 2     doxycycline monohydrate 100 MG capsule Take 1 tablet twice daily with food and large glass of water 60 capsule 1     sertraline (ZOLOFT) 50 MG tablet Take 1/2 tablet (25 mg) for 1-2 weeks, then increase to 1 tablet orally daily 30 tablet 3     spironolactone (ALDACTONE) 100 MG tablet Take 1 tablet (100 mg) by mouth daily 90 tablet 1     spironolactone (ALDACTONE) 100 MG tablet Take 1 tablet QD 90 tablet 1     spironolactone (ALDACTONE) 50 MG tablet 1 tab PO QD 90 tablet 1     tretinoin (RETIN-A) 0.025 % cream Apply pea sized amount to face at night 45 g 1     No Known Allergies  Denies any other skin complaints, in general feels well: Yes  Review of symptoms otherwise negative:Yes    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 Moved into [facility] from [facility] to formerly [facility].    This document serves as a record of the services and decisions personally performed and made by Hallie Us, MS, PA-C. It was created on her behalf by Vi Mir, a trained medical scribe. The creation of this document is based on the provider's statements to the medical scribe.  Vi Mir 4:06 PM August 21, 2018    PHYSICAL EXAM:   A&Ox3: Yes   Well developed/well nourished female Yes   Mood appropriate Yes      /67  Pulse 54  SpO2 97%  Breastfeeding? No  Type 2 skin.  Mood appropriate  Alert and Oriented X 3. Well developed, well nourished in no distress.  General appearance: Normal  Head including face: Normal  Eyes: conjunctiva and lids: Normal  Mouth: Lips, teeth, gums: Normal  Neck: Normal  Back: clear  Cardiovascular: Exam of peripheral vascular system by observation for swelling, varicosities, edema: Normal  Extremities: digits/nails (clubbing): Normal  Right upper extremity: Normal  Left upper extremity: Normal  Right lower extremity: Normal  Left lower extremity: Normal  Skin: Scalp and body hair: See below     Comedones Papules/Pustules Cysts Staining Scarring   Face/Neck 0-1+ 0 0 0-1+ 0   Chest 0 0 0 0 0   Back 0 0 0 0 0     Telangiectasias: No Fixed Erythema: No Exoriations: No   Other Physical Exam Findings:  1. 4 mm dark brown/light brown macule on left eyelid margin   2. 5 mm pink macule on left upper cutaneous lip.  ASSESSMENT & PLAN:     1. Acne Vulgaris - advised on diagnosis and treatment options. Doing great with spironolactone 150 mg and tretinoin. Using CeraVe foaming cleanser, toner, vitamin C serum, and CeraVe AM/PM moisturizer and tretinoin. Discussed use of PO and topical medications and antibiotics. Currently using IUD for contraception. Had to discontinue doxycycline due to GI upset. With her current   --Continue spironolactone 150 mg daily. Advised on potential for hypotension, teratogenetic effects, menstrual irregularities, hyperkalemia and need for Q 6 month K+ checks. Advised to stay hydrated.   --Continue pea sized amount tretinoin 0.025% cream to face at night  --Continue ampicillin 500 mg BID x 1 month then stop  --Potassium levels checked today     2. Neus on left eyelid margin, will monitor.  Photo taken and placed in chart. Per pt she has had this since she was a baby and has not changed.       Pt advised on use and risks including photosensitivity, allergic reactions, GI upset, headaches, nausea, erythema, scaling, vertigo, asthralgias,  blood clots:Yes    Follow-up: 2-3 months if struggling/Q 6 months if doing well  CC:   Scribed By: Vi Mir, Medical Scribe    The information in this document, created by the medical scribe for me, accurately reflects the services I personally performed and the decisions made by me. I have reviewed and approved this document for accuracy prior to leaving the patient care area.  August 21, 2018 4:10 PM    Hallie Us MS, PA-C

## 2018-08-21 NOTE — LETTER
8/21/2018         RE: Annabella Garcia  [address]  [place] [address]        Dear Colleague,    Thank you for referring your patient, Annabella Garcia, to the [facility]. Please see a copy of my visit note below.    HPI:   Annabella Garcia is a 23 year old female who presents for follow up of acne. On spironolactone 150 mg, tretinoin, and ampicillin. Doing well.   chief complaint  Condition has been present for: years. Acne was previously well controlled with OCP, however, it flared again when she switched to IUD a couple of months ago  Pt complains of pain: Yes     Previous treatments include: OCP  Menstrual cycles are regular: Yes   Condition flares around period: Yes  Areas Involved: face  IPLEDGE #:   Current Outpatient Prescriptions   Medication Sig Dispense Refill     ampicillin (PRINCIPEN) 500 MG capsule 1 cap PO BID 60 capsule 2     doxycycline monohydrate 100 MG capsule Take 1 tablet twice daily with food and large glass of water 60 capsule 1     sertraline (ZOLOFT) 50 MG tablet Take 1/2 tablet (25 mg) for 1-2 weeks, then increase to 1 tablet orally daily 30 tablet 3     spironolactone (ALDACTONE) 100 MG tablet Take 1 tablet (100 mg) by mouth daily 90 tablet 1     spironolactone (ALDACTONE) 100 MG tablet Take 1 tablet QD 90 tablet 1     spironolactone (ALDACTONE) 50 MG tablet 1 tab PO QD 90 tablet 1     tretinoin (RETIN-A) 0.025 % cream Apply pea sized amount to face at night 45 g 1     No Known Allergies  Denies any other skin complaints, in general feels well: Yes  Review of symptoms otherwise negative:Yes    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    This document serves as a record of the services and decisions personally performed and made by Hallie Us, MS, PA-C. It was created on her behalf by Vi Mir, a trained medical scribe. The creation of this document is based on the  provider's statements to the medical scribe.  Vi Mir 4:06 PM August 21, 2018    PHYSICAL EXAM:   A&Ox3: Yes   Well developed/well nourished female Yes   Mood appropriate Yes      /67  Pulse 54  SpO2 97%  Breastfeeding? No  Type 2 skin. Mood appropriate  Alert and Oriented X 3. Well developed, well nourished in no distress.  General appearance: Normal  Head including face: Normal  Eyes: conjunctiva and lids: Normal  Mouth: Lips, teeth, gums: Normal  Neck: Normal  Back: clear  Cardiovascular: Exam of peripheral vascular system by observation for swelling, varicosities, edema: Normal  Extremities: digits/nails (clubbing): Normal  Right upper extremity: Normal  Left upper extremity: Normal  Right lower extremity: Normal  Left lower extremity: Normal  Skin: Scalp and body hair: See below     Comedones Papules/Pustules Cysts Staining Scarring   Face/Neck 0-1+ 0 0 0-1+ 0   Chest 0 0 0 0 0   Back 0 0 0 0 0     Telangiectasias: No Fixed Erythema: No Exoriations: No   Other Physical Exam Findings:  1. 4 mm dark brown/light brown macule on left eyelid margin   2. 5 mm pink macule on left upper cutaneous lip.  ASSESSMENT & PLAN:     1. Acne Vulgaris - advised on diagnosis and treatment options. Doing great with spironolactone 150 mg and tretinoin. Using CeraVe foaming cleanser, toner, vitamin C serum, and CeraVe AM/PM moisturizer and tretinoin. Discussed use of PO and topical medications and antibiotics. Currently using IUD for contraception. Had to discontinue doxycycline due to GI upset. With her current   --Continue spironolactone 150 mg daily. Advised on potential for hypotension, teratogenetic effects, menstrual irregularities, hyperkalemia and need for Q 6 month K+ checks. Advised to stay hydrated.   --Continue pea sized amount tretinoin 0.025% cream to face at night  --Continue ampicillin 500 mg BID x 1 month then stop  --Potassium levels checked today     2. Neus on left eyelid margin, will  monitor.  Photo taken and placed in chart. Per pt she has had this since she was a baby and has not changed.       Pt advised on use and risks including photosensitivity, allergic reactions, GI upset, headaches, nausea, erythema, scaling, vertigo, asthralgias, blood clots:Yes    Follow-up: 2-3 months if struggling/Q 6 months if doing well  CC:   Scribed By: Vi Mir, Medical Scribe    The information in this document, created by the medical scribe for me, accurately reflects the services I personally performed and the decisions made by me. I have reviewed and approved this document for accuracy prior to leaving the patient care area.  August 21, 2018 4:10 PM    Hallie Us MS, PAMERA        Again, thank you for allowing me to participate in the care of your patient.        Sincerely,        Hallie Us PA-C

## 2018-08-21 NOTE — MR AVS SNAPSHOT
After Visit Summary   8/21/2018    Annabella Garcia    MRN: [id number]           Patient Information     Date Of Birth          1995        Visit Information        Provider Department      8/21/2018 4:00 PM Hallie Us PA-C [facility]        Today's Diagnoses     Therapeutic drug monitoring    -  1    Acne vulgaris        Nevus           Follow-ups after your visit        Your next 10 appointments already scheduled     Oct 23, 2018  4:00 PM CDT   Return Visit with Hallie Us PA-C   [facility]    [address] [address] [phone]              Who to contact     If you have questions or need follow up information about today's clinic visit or your schedule please contact [facility] directly at [phone].  Normal or non-critical lab and imaging results will be communicated to you by MyChart, letter or phone within 4 business days after the clinic has received the results. If you do not hear from us within 7 days, please contact the clinic through MyChart or phone. If you have a critical or abnormal lab result, we will notify you by phone as soon as possible.  Submit refill requests through [employer] or call your pharmacy and they will forward the refill request to us. Please allow 3 business days for your refill to be completed.          Additional Information About Your Visit        Care EveryWhere ID     This is your Care EveryWhere ID. This could be used by other organizations to access your [place] medical records  [id number]        Your Vitals Were     Pulse Pulse Oximetry Breastfeeding?             54 97% No          Blood Pressure from Last 3 Encounters:   08/21/18 110/67   07/05/18 109/76   06/19/18 106/71    Weight from Last 3 Encounters:   07/05/18 59.4 kg (131 lb)   04/18/14 60.7 kg (133 lb 12.8 oz) (63 %)*     * Growth percentiles  are based on CDC 2-20 Years data.              We Performed the Following     Potassium        Primary Care Provider Fax #    Physician No Ref-Primary [phone]       No address on file        Equal Access to Services     MARYMILLICENT PAULA : Hadii aad ku hadsoraida Wolfe, juliocesar kamhanna, marcello palumbo, sara álvarezaristeo bunny. [facility] [phone].    ATENCIÓN: Si habla español, tiene a lema disposición servicios gratuitos de asistencia lingüística. Llame al [phone].    We comply with applicable federal civil rights laws and [place] laws. We do not discriminate on the basis of race, color, national origin, age, disability, sex, sexual orientation, or gender identity.            Thank you!     Thank you for choosing [facility]  for your care. Our goal is always to provide you with excellent care. Hearing back from our patients is one way we can continue to improve our services. Please take a few minutes to complete the written survey that you may receive in the mail after your visit with us. Thank you!             Your Updated Medication List - Protect others around you: Learn how to safely use, store and throw away your medicines at www.disposemymeds.org.          This list is accurate as of 8/21/18  4:29 PM.  Always use your most recent med list.                   Brand Name Dispense Instructions for use Diagnosis    ampicillin 500 MG capsule    PRINCIPEN    60 capsule    1 cap PO BID    Acne vulgaris       doxycycline monohydrate 100 MG capsule     60 capsule    Take 1 tablet twice daily with food and large glass of water    Acne vulgaris       sertraline 50 MG tablet    ZOLOFT    30 tablet    Take 1/2 tablet (25 mg) for 1-2 weeks, then increase to 1 tablet orally daily    JAYRO (generalized anxiety disorder)       * spironolactone 100 MG tablet    ALDACTONE    90 tablet    Take 1 tablet QD    Acne vulgaris       * spironolactone 50 MG tablet    ALDACTONE     90 tablet    1 tab PO QD    Acne vulgaris       * spironolactone 100 MG tablet    ALDACTONE    90 tablet    Take 1 tablet (100 mg) by mouth daily    Acne vulgaris       tretinoin 0.025 % cream    RETIN-A    45 g    Apply pea sized amount to face at night    Acne vulgaris       * Notice:  This list has 3 medication(s) that are the same as other medications prescribed for you. Read the directions carefully, and ask your doctor or other care provider to review them with you.

## 2018-08-22 ENCOUNTER — TELEPHONE (OUTPATIENT)
Dept: DERMATOLOGY | Facility: CLINIC | Age: 23
End: 2018-08-22

## 2018-08-22 NOTE — TELEPHONE ENCOUNTER
Called and LM for patient to call back in regards to lab results and providers notes.    Ramila RN-BSN  [facility]  [phone]

## 2018-08-22 NOTE — TELEPHONE ENCOUNTER
Notes Recorded by Hallie Us PA-C on 8/21/2018 at 6:05 PM  K+ is normal; continue spironolactone as prescribed

## 2018-08-31 ENCOUNTER — TELEPHONE (OUTPATIENT)
Dept: DERMATOLOGY | Facility: CLINIC | Age: 23
End: 2018-08-31

## 2018-08-31 DIAGNOSIS — L70.0 ACNE VULGARIS: ICD-10-CM

## 2018-08-31 RX ORDER — SPIRONOLACTONE 100 MG/1
150 TABLET, FILM COATED ORAL DAILY
Qty: 90 TABLET | Refills: 1 | Status: SHIPPED | OUTPATIENT
Start: 2018-08-31

## 2018-08-31 NOTE — TELEPHONE ENCOUNTER
1 refill given per [facility] protocol and per provider.    Ramila RN-BSN  [facility]  [phone]

## 2018-08-31 NOTE — TELEPHONE ENCOUNTER
SPIRONOLACTONE 100MG TABLETS on backorder.  Would you like to change it to a different strength?  Pls advise.

## 2018-11-19 DIAGNOSIS — L70.0 ACNE VULGARIS: ICD-10-CM

## 2018-11-19 RX ORDER — TRETINOIN 0.25 MG/G
CREAM TOPICAL
Qty: 45 G | Refills: 3 | Status: SHIPPED | OUTPATIENT
Start: 2018-11-19

## 2018-11-21 DIAGNOSIS — F41.1 GAD (GENERALIZED ANXIETY DISORDER): ICD-10-CM

## 2018-11-23 NOTE — TELEPHONE ENCOUNTER
"Last OV 7/5/18 - started on medication.  Medication is being filled for 1 time refill only due to:  Patient needs to be seen because due for follow up.   Cait Glasgow RN    Requested Prescriptions   Signed Prescriptions Disp Refills     sertraline (ZOLOFT) 50 MG tablet 30 tablet 0     Sig: Take 1 tablet (50 mg) by mouth daily    SSRIs Protocol Passed    11/21/2018  2:03 PM       Passed - Recent (12 mo) or future (30 days) visit within the authorizing provider's specialty    Patient had office visit in the last 12 months or has a visit in the next 30 days with authorizing provider or within the authorizing provider's specialty.  See \"Patient Info\" tab in inbasket, or \"Choose Columns\" in Meds & Orders section of the refill encounter.             Passed - Patient is age 18 or older       Passed - No active pregnancy on record       Passed - No positive pregnancy test in last 12 months            "

## 2018-11-27 DIAGNOSIS — L70.0 ACNE VULGARIS: ICD-10-CM

## 2018-11-28 RX ORDER — SPIRONOLACTONE 50 MG/1
TABLET, FILM COATED ORAL
Qty: 90 TABLET | Refills: 1 | Status: SHIPPED | OUTPATIENT
Start: 2018-11-28

## 2018-11-28 NOTE — TELEPHONE ENCOUNTER
Protocol failed for his patients refill.     Normal serum creatinine on file in past 12 months    Normal serum sodium on file in past 12 months     ANTONIO Mcgrath-BSN  [facility]  [phone]

## 2019-01-04 DIAGNOSIS — F41.1 GAD (GENERALIZED ANXIETY DISORDER): ICD-10-CM

## 2019-01-07 NOTE — TELEPHONE ENCOUNTER
"Left message for patient to callback.  Need to see how pt doing on Sertraline  Ying BAUMANN RN    Requested Prescriptions   Pending Prescriptions Disp Refills     sertraline (ZOLOFT) 50 MG tablet 30 tablet 0     Sig: Take 1 tablet (50 mg) by mouth daily    SSRIs Protocol Passed - 1/4/2019  2:32 PM       Passed - Recent (12 mo) or future (30 days) visit within the authorizing provider's specialty    Patient had office visit in the last 12 months or has a visit in the next 30 days with authorizing provider or within the authorizing provider's specialty.  See \"Patient Info\" tab in inbasket, or \"Choose Columns\" in Meds & Orders section of the refill encounter.             Passed - Medication is active on med list       Passed - Patient is age 18 or older       Passed - No active pregnancy on record       Passed - No positive pregnancy test in last 12 months            "

## 2019-01-09 NOTE — TELEPHONE ENCOUNTER
Pt stated the medication is working really well for her and no noted side effects. Requesting a 90 day supply

## 2019-01-09 NOTE — TELEPHONE ENCOUNTER
JS,   Left  #2 for patient  See below messages  No response from patient to our outreach  Please advise on further refill  Thanks,  Ying BAUMANN RN

## 2019-01-11 DIAGNOSIS — L70.0 ACNE VULGARIS: ICD-10-CM

## 2019-01-16 RX ORDER — SPIRONOLACTONE 100 MG/1
TABLET, FILM COATED ORAL
Qty: 90 TABLET | Refills: 1 | Status: SHIPPED | OUTPATIENT
Start: 2019-01-16

## 2019-01-16 NOTE — TELEPHONE ENCOUNTER
Protocol failed for this patient      Diuretics (Including Combos) Protocol Failed1/16 11:11 AM   Normal serum creatinine on file in past 12 months    Normal serum sodium on file in past 12 months     ANTONIO Mcgrath-BSN  [facility]  [phone]

## 2019-04-05 ENCOUNTER — TELEPHONE (OUTPATIENT)
Dept: FAMILY MEDICINE | Facility: CLINIC | Age: 24
End: 2019-04-05

## 2019-04-05 DIAGNOSIS — F41.1 GAD (GENERALIZED ANXIETY DISORDER): Primary | ICD-10-CM

## 2019-04-05 RX ORDER — SERTRALINE HYDROCHLORIDE 100 MG/1
100 TABLET, FILM COATED ORAL DAILY
Qty: 30 TABLET | Refills: 1 | Status: SHIPPED | OUTPATIENT
Start: 2019-04-05 | End: 2019-05-30

## 2019-04-05 NOTE — TELEPHONE ENCOUNTER
JS  Patient called.  Asking if she can increase the Sertraline to 100 mg daily.  States the 50 mg was working up to 3 weeks ago and now it feels like she's not taking anything.    Last OV 7/5/18  Thanks,  Cait Glasgow RN

## 2019-04-05 NOTE — TELEPHONE ENCOUNTER
Reason for Call:  Other prescription    Detailed comments: pt would like to have her sertraline (ZOLOFT) 50 MG tablet    increased to 100mg     Phone Number Patient can be reached at: Home number on file [phone] (home)    Best Time: any    Can we leave a detailed message on this number? YES    Call taken on 4/5/2019 at 12:23 PM by Brittney Zuñiga

## 2019-05-30 DIAGNOSIS — F41.1 GAD (GENERALIZED ANXIETY DISORDER): ICD-10-CM

## 2019-06-03 RX ORDER — SERTRALINE HYDROCHLORIDE 100 MG/1
TABLET, FILM COATED ORAL
Qty: 30 TABLET | Refills: 0 | Status: SHIPPED | OUTPATIENT
Start: 2019-06-03 | End: 2019-07-25

## 2019-06-03 NOTE — TELEPHONE ENCOUNTER
"Medication is being filled for 1 time refill only due to:  Patient needs to be seen because Due for follow up.     Note from 4/5/19 TE (Sertraline increased to 100 mg daily):  Can increase.  Would encourage follow up in 1-2 months for recheck.     Jh Glasgow RN    Requested Prescriptions   Signed Prescriptions Disp Refills    sertraline (ZOLOFT) 100 MG tablet 30 tablet 0     Sig: TAKE 1 TABLET BY MOUTH DAILY       SSRIs Protocol Passed - 5/31/2019  8:54 PM        Passed - Recent (12 mo) or future (30 days) visit within the authorizing provider's specialty     Patient had office visit in the last 12 months or has a visit in the next 30 days with authorizing provider or within the authorizing provider's specialty.  See \"Patient Info\" tab in inbasket, or \"Choose Columns\" in Meds & Orders section of the refill encounter.              Passed - Medication is active on med list        Passed - Patient is age 18 or older        Passed - No active pregnancy on record        Passed - No positive pregnancy test in last 12 months              "

## 2019-07-25 ENCOUNTER — OFFICE VISIT (OUTPATIENT)
Dept: FAMILY MEDICINE | Facility: CLINIC | Age: 24
End: 2019-07-25
Payer: COMMERCIAL

## 2019-07-25 VITALS
RESPIRATION RATE: 14 BRPM | HEIGHT: 65 IN | SYSTOLIC BLOOD PRESSURE: 110 MMHG | DIASTOLIC BLOOD PRESSURE: 72 MMHG | BODY MASS INDEX: 22.16 KG/M2 | WEIGHT: 133 LBS | TEMPERATURE: 97.9 F | HEART RATE: 54 BPM

## 2019-07-25 DIAGNOSIS — F41.1 GAD (GENERALIZED ANXIETY DISORDER): Primary | ICD-10-CM

## 2019-07-25 DIAGNOSIS — R53.83 OTHER FATIGUE: ICD-10-CM

## 2019-07-25 LAB
BASOPHILS # BLD AUTO: 0 10E9/L (ref 0–0.2)
BASOPHILS NFR BLD AUTO: 0.1 %
DIFFERENTIAL METHOD BLD: ABNORMAL
EOSINOPHIL # BLD AUTO: 0 10E9/L (ref 0–0.7)
EOSINOPHIL NFR BLD AUTO: 0.4 %
ERYTHROCYTE [DISTWIDTH] IN BLOOD BY AUTOMATED COUNT: 11.1 % (ref 10–15)
HCT VFR BLD AUTO: 41.3 % (ref 35–47)
HGB BLD-MCNC: 14.2 G/DL (ref 11.7–15.7)
LYMPHOCYTES # BLD AUTO: 1.9 10E9/L (ref 0.8–5.3)
LYMPHOCYTES NFR BLD AUTO: 23.6 %
MCH RBC QN AUTO: 33.2 PG (ref 26.5–33)
MCHC RBC AUTO-ENTMCNC: 34.4 G/DL (ref 31.5–36.5)
MCV RBC AUTO: 97 FL (ref 78–100)
MONOCYTES # BLD AUTO: 1 10E9/L (ref 0–1.3)
MONOCYTES NFR BLD AUTO: 12.2 %
NEUTROPHILS # BLD AUTO: 5 10E9/L (ref 1.6–8.3)
NEUTROPHILS NFR BLD AUTO: 63.7 %
PLATELET # BLD AUTO: 300 10E9/L (ref 150–450)
RBC # BLD AUTO: 4.28 10E12/L (ref 3.8–5.2)
VIT B12 SERPL-MCNC: 218 PG/ML (ref 193–986)
WBC # BLD AUTO: 7.9 10E9/L (ref 4–11)

## 2019-07-25 PROCEDURE — 85025 COMPLETE CBC W/AUTO DIFF WBC: CPT | Performed by: PHYSICIAN ASSISTANT

## 2019-07-25 PROCEDURE — 84443 ASSAY THYROID STIM HORMONE: CPT | Performed by: PHYSICIAN ASSISTANT

## 2019-07-25 PROCEDURE — 82607 VITAMIN B-12: CPT | Performed by: PHYSICIAN ASSISTANT

## 2019-07-25 PROCEDURE — 82306 VITAMIN D 25 HYDROXY: CPT | Performed by: PHYSICIAN ASSISTANT

## 2019-07-25 PROCEDURE — 36415 COLL VENOUS BLD VENIPUNCTURE: CPT | Performed by: PHYSICIAN ASSISTANT

## 2019-07-25 PROCEDURE — 99214 OFFICE O/P EST MOD 30 MIN: CPT | Performed by: PHYSICIAN ASSISTANT

## 2019-07-25 RX ORDER — BUSPIRONE HYDROCHLORIDE 5 MG/1
TABLET ORAL
Qty: 60 TABLET | Refills: 0 | Status: SHIPPED | OUTPATIENT
Start: 2019-07-25 | End: 2019-09-19

## 2019-07-25 ASSESSMENT — PATIENT HEALTH QUESTIONNAIRE - PHQ9
5. POOR APPETITE OR OVEREATING: SEVERAL DAYS
SUM OF ALL RESPONSES TO PHQ QUESTIONS 1-9: 13

## 2019-07-25 ASSESSMENT — ANXIETY QUESTIONNAIRES
1. FEELING NERVOUS, ANXIOUS, OR ON EDGE: MORE THAN HALF THE DAYS
7. FEELING AFRAID AS IF SOMETHING AWFUL MIGHT HAPPEN: SEVERAL DAYS
5. BEING SO RESTLESS THAT IT IS HARD TO SIT STILL: NOT AT ALL
3. WORRYING TOO MUCH ABOUT DIFFERENT THINGS: SEVERAL DAYS
6. BECOMING EASILY ANNOYED OR IRRITABLE: SEVERAL DAYS
GAD7 TOTAL SCORE: 8
IF YOU CHECKED OFF ANY PROBLEMS ON THIS QUESTIONNAIRE, HOW DIFFICULT HAVE THESE PROBLEMS MADE IT FOR YOU TO DO YOUR WORK, TAKE CARE OF THINGS AT HOME, OR GET ALONG WITH OTHER PEOPLE: SOMEWHAT DIFFICULT
2. NOT BEING ABLE TO STOP OR CONTROL WORRYING: MORE THAN HALF THE DAYS

## 2019-07-25 ASSESSMENT — MIFFLIN-ST. JEOR: SCORE: 1354.16

## 2019-07-25 NOTE — PROGRESS NOTES
Subjective     Annabella Garcia is a 24 year old female who presents to clinic today for the following health issues:    HPI   Medication Followup of zoloft    Taking Medication as prescribed: yes    Side Effects:  None    Medication Helping Symptoms:  NO     Anxiety Follow-Up    How are you doing with your anxiety since your last visit? Worsened     Are you having other symptoms that might be associated with anxiety? No    Have you had a significant life event? No     Are you feeling depressed? Yes:  just a bit    Do you have any concerns with your use of alcohol or other drugs? No    Social History     Tobacco Use     Smoking status: Never Smoker     Smokeless tobacco: Never Used   Substance Use Topics     Alcohol use: No     Drug use: Unknown     Types: Other     Comment: Drug use: No     JAYRO-7 SCORE 7/5/2018 7/25/2019   Total Score 16 8     PHQ 7/25/2019   PHQ-9 Total Score 13   Q9: Thoughts of better off dead/self-harm past 2 weeks Not at all     23 y/o female here for follow up.  We restarted sertraline 1 year ago, and it had been helping for about 6-8 months.  She has had some increase in stress, but generally feels like things going well.  Despite that, she feels nervous/anxious most of the time.  She did try to increase the zoloft to 100 mg.  Did not notice any benefits or side effects.  She eats very healthy, and runs most days.  She is interested in possible next steps.  She has done counseling in the past, was not beneficial.      She does feel better when she is taking zoloft, so she understands it is helping some.    BP Readings from Last 3 Encounters:   07/25/19 110/72   08/21/18 110/67   07/05/18 109/76    Wt Readings from Last 3 Encounters:   07/25/19 60.3 kg (133 lb)   07/05/18 59.4 kg (131 lb)   04/18/14 60.7 kg (133 lb 12.8 oz) (63 %)*     * Growth percentiles are based on CDC (Girls, 2-20 Years) data.                      Reviewed and updated as needed this visit by Provider         Review of  "Systems   ROS COMP: Constitutional, HEENT, cardiovascular, pulmonary, gi and gu systems are negative, except as otherwise noted.      Objective    /72   Pulse 54   Temp 97.9  F (36.6  C) (Oral)   Resp 14   Ht 1.651 m (5' 5\")   Wt 60.3 kg (133 lb)   Breastfeeding? No   BMI 22.13 kg/m    Body mass index is 22.13 kg/m .  Physical Exam   GENERAL: alert and no distress  EYES: Eyes grossly normal to inspection  RESP: lungs clear to auscultation - no rales, rhonchi or wheezes  CV: regular rate and rhythm, normal S1 S2, no S3 or S4, no murmur, click or rub, no peripheral edema and peripheral pulses strong  PSYCH: mentation appears normal, affect normal/bright    Diagnostic Test Results:  Labs reviewed in Epic        Assessment & Plan     1. JAYRO (generalized anxiety disorder)  We discussed next steps for evaluation and treatment.  Since she has been having some associated fatigue, will get some labs to rule out other conditions.  She is also interested in trying another medication.  We discussed multiple options, and after going over potential benefits and side effects, which does include making things worse, she will try buspar.  Will also take her back down to 50 mg of zoloft.  - CBC with platelets differential  - TSH with free T4 reflex  - sertraline (ZOLOFT) 50 MG tablet; Take 1 tablet (50 mg) by mouth daily  Dispense: 90 tablet; Refill: 3  - busPIRone (BUSPAR) 5 MG tablet; 5 mg po every day x 3 days, 5 mg PO BID x 3 days, 10 mg po Q am 5 mg q pm x 3 days, 10 mg BID x 3 days  Dispense: 60 tablet; Refill: 0    2. Other fatigue    - Vitamin D Deficiency  - Vitamin B12  - CBC with platelets differential  - TSH with free T4 reflex           Return in about 6 months (around 1/25/2020).    Fritz Lakhani PA-C  [facility]      "

## 2019-07-26 LAB
DEPRECATED CALCIDIOL+CALCIFEROL SERPL-MC: 41 UG/L (ref 20–75)
TSH SERPL DL<=0.005 MIU/L-ACNC: 2.16 MU/L (ref 0.4–4)

## 2019-07-26 ASSESSMENT — ANXIETY QUESTIONNAIRES: GAD7 TOTAL SCORE: 8

## 2019-09-23 DIAGNOSIS — L70.0 ACNE VULGARIS: ICD-10-CM

## 2019-09-23 RX ORDER — SPIRONOLACTONE 100 MG/1
150 TABLET, FILM COATED ORAL DAILY
Qty: 90 TABLET | Refills: 1 | Status: CANCELLED | OUTPATIENT
Start: 2019-09-23

## 2019-09-23 NOTE — TELEPHONE ENCOUNTER
Snail mail letter sent:    Dear Ms. Garcia,    Many medications require routine follow-up with your Doctor.      At this time we ask that: You schedule a routine office visit with Hallie Us PA-C in Dermatology.    Your prescription: Cannot be refilled until the above noted follow up has been completed.      Thank you,      [facility]

## 2019-09-23 NOTE — LETTER
[facility]  [address] [address]  Phone: [phone]    September 23, 2019      Annabella Garcia                                                                                                                [address] [address]            Dear Ms. Garcia,    Many medications require routine follow-up with your Doctor.      At this time we ask that: You schedule a routine office visit with Hallie Us PA-C in Dermatology.    Your prescription: Cannot be refilled until the above noted follow up has been completed.      Thank you,      [facility]

## 2019-11-08 DIAGNOSIS — L70.0 ACNE VULGARIS: ICD-10-CM

## 2019-11-08 RX ORDER — SPIRONOLACTONE 100 MG/1
150 TABLET, FILM COATED ORAL DAILY
Qty: 90 TABLET | Refills: 1 | OUTPATIENT
Start: 2019-11-08

## 2019-11-08 NOTE — LETTER
[facility]  [address] [facility] [address]  Phone: [phone]    November 11, 2019      Annabella Garcia                                                                                                             [address] [place] [address]          Dear Ms. Garcia,    Many medications require routine follow-up with your Doctor.      At this time we ask that: You schedule a routine office visit with Hallie Us PA-C in Dermatology.    Your prescription: Cannot be refilled until the above noted follow up has been completed.      Thank you,      [facility]

## 2019-11-08 NOTE — TELEPHONE ENCOUNTER
Called patient and left message to call back. Please let patient know that we got refill request for her spironolactone and provider denied refill and stated that patient needs to be seen for apt before can get refill. Please help sched apt.

## 2019-11-11 NOTE — TELEPHONE ENCOUNTER
Snail mail letter sent:    Dear Ms. Garcia,    Many medications require routine follow-up with your Doctor.      At this time we ask that: You schedule a routine office visit with Hallie Us PA-C in Dermatology.    Your prescription: Cannot be refilled until the above noted follow up has been completed.      Thank you,      [facility]

## 2019-11-11 NOTE — TELEPHONE ENCOUNTER
Called and LM for patient- refill was denied, needs a f/u appointment for further refills.    Ramila RN-BSN-PHN  [facility]  [phone]